# Patient Record
Sex: MALE | Race: BLACK OR AFRICAN AMERICAN | Employment: FULL TIME | URBAN - METROPOLITAN AREA
[De-identification: names, ages, dates, MRNs, and addresses within clinical notes are randomized per-mention and may not be internally consistent; named-entity substitution may affect disease eponyms.]

---

## 2018-05-29 ENCOUNTER — HOSPITAL ENCOUNTER (OUTPATIENT)
Age: 38
Discharge: HOME | End: 2018-05-29
Attending: FAMILY MEDICINE
Payer: COMMERCIAL

## 2018-05-29 VITALS
OXYGEN SATURATION: 100 % | HEART RATE: 85 BPM | TEMPERATURE: 98 F | RESPIRATION RATE: 18 BRPM | DIASTOLIC BLOOD PRESSURE: 73 MMHG | SYSTOLIC BLOOD PRESSURE: 100 MMHG

## 2018-05-29 DIAGNOSIS — J01.00 ACUTE NON-RECURRENT MAXILLARY SINUSITIS: Primary | ICD-10-CM

## 2018-05-29 DIAGNOSIS — J06.9 ACUTE URI: ICD-10-CM

## 2018-05-29 PROCEDURE — 99213 OFFICE O/P EST LOW 20 MIN: CPT

## 2018-05-29 PROCEDURE — 87430 STREP A AG IA: CPT

## 2018-05-29 PROCEDURE — 99214 OFFICE O/P EST MOD 30 MIN: CPT

## 2018-05-29 RX ORDER — CODEINE PHOSPHATE AND GUAIFENESIN 10; 100 MG/5ML; MG/5ML
10 SOLUTION ORAL NIGHTLY PRN
Qty: 100 ML | Refills: 0 | Status: SHIPPED | OUTPATIENT
Start: 2018-05-29 | End: 2018-06-08

## 2018-05-29 RX ORDER — AMOXICILLIN 875 MG/1
875 TABLET, COATED ORAL 2 TIMES DAILY
Qty: 20 TABLET | Refills: 0 | Status: SHIPPED | OUTPATIENT
Start: 2018-05-29 | End: 2018-06-08

## 2018-05-29 NOTE — ED PROVIDER NOTES
Patient presents with:  Cough/URI      HPI:     Bao  is a 40year old male who presents with for chief complaint of nasal congestion, sinus congestion, yellow nasal secretions, chest congestion, cough  X 6 days.     Getting worse  The patient yanet normal. No rashes or lesions      Assessment/Plan:     Labs performed this visit:    Recent Results (from the past 10 hour(s))  -[facility] POCT RAPID STREP   Collection Time: 05/29/18  2:39 PM   Result Value Ref Range   POCT Rapid Strep Negative Negative       D

## 2018-05-29 NOTE — ED INITIAL ASSESSMENT (HPI)
Patient comes in with cough and headache since Thursday. Over-the-counter remedies have not helped much.

## 2018-06-20 ENCOUNTER — HOSPITAL ENCOUNTER (OUTPATIENT)
Age: 38
Discharge: ANOTHER HOSPITAL | End: 2018-06-20
Attending: FAMILY MEDICINE
Payer: COMMERCIAL

## 2018-06-20 ENCOUNTER — HOSPITAL ENCOUNTER (EMERGENCY)
Facility: HOSPITAL | Age: 38
Discharge: HOME | End: 2018-06-20
Attending: EMERGENCY MEDICINE
Payer: COMMERCIAL

## 2018-06-20 ENCOUNTER — APPOINTMENT (OUTPATIENT)
Dept: GENERAL RADIOLOGY | Age: 38
End: 2018-06-20
Attending: FAMILY MEDICINE
Payer: COMMERCIAL

## 2018-06-20 VITALS
OXYGEN SATURATION: 99 % | BODY MASS INDEX: 23.8 KG/M2 | HEART RATE: 68 BPM | DIASTOLIC BLOOD PRESSURE: 86 MMHG | WEIGHT: 170 LBS | SYSTOLIC BLOOD PRESSURE: 142 MMHG | RESPIRATION RATE: 20 BRPM | HEIGHT: 71 IN | TEMPERATURE: 98 F

## 2018-06-20 VITALS
TEMPERATURE: 98 F | OXYGEN SATURATION: 99 % | DIASTOLIC BLOOD PRESSURE: 93 MMHG | SYSTOLIC BLOOD PRESSURE: 106 MMHG | RESPIRATION RATE: 16 BRPM | HEART RATE: 77 BPM

## 2018-06-20 DIAGNOSIS — J40 BRONCHITIS: Primary | ICD-10-CM

## 2018-06-20 DIAGNOSIS — R07.89 CHEST PAIN, NON-CARDIAC: ICD-10-CM

## 2018-06-20 DIAGNOSIS — R05.9 COUGH: ICD-10-CM

## 2018-06-20 DIAGNOSIS — R07.9 RIGHT-SIDED CHEST PAIN: Primary | ICD-10-CM

## 2018-06-20 PROCEDURE — 94640 AIRWAY INHALATION TREATMENT: CPT

## 2018-06-20 PROCEDURE — 93010 ELECTROCARDIOGRAM REPORT: CPT

## 2018-06-20 PROCEDURE — 85025 COMPLETE CBC W/AUTO DIFF WBC: CPT

## 2018-06-20 PROCEDURE — 85025 COMPLETE CBC W/AUTO DIFF WBC: CPT | Performed by: EMERGENCY MEDICINE

## 2018-06-20 PROCEDURE — 93005 ELECTROCARDIOGRAM TRACING: CPT

## 2018-06-20 PROCEDURE — 80048 BASIC METABOLIC PNL TOTAL CA: CPT | Performed by: EMERGENCY MEDICINE

## 2018-06-20 PROCEDURE — 93010 ELECTROCARDIOGRAM REPORT: CPT | Performed by: EMERGENCY MEDICINE

## 2018-06-20 PROCEDURE — 80048 BASIC METABOLIC PNL TOTAL CA: CPT

## 2018-06-20 PROCEDURE — 71046 X-RAY EXAM CHEST 2 VIEWS: CPT | Performed by: FAMILY MEDICINE

## 2018-06-20 PROCEDURE — 85379 FIBRIN DEGRADATION QUANT: CPT | Performed by: EMERGENCY MEDICINE

## 2018-06-20 PROCEDURE — 99214 OFFICE O/P EST MOD 30 MIN: CPT

## 2018-06-20 PROCEDURE — 99285 EMERGENCY DEPT VISIT HI MDM: CPT

## 2018-06-20 PROCEDURE — 36415 COLL VENOUS BLD VENIPUNCTURE: CPT

## 2018-06-20 PROCEDURE — 84484 ASSAY OF TROPONIN QUANT: CPT | Performed by: EMERGENCY MEDICINE

## 2018-06-20 PROCEDURE — 84484 ASSAY OF TROPONIN QUANT: CPT

## 2018-06-20 RX ORDER — PREDNISONE 20 MG/1
60 TABLET ORAL ONCE
Status: COMPLETED | OUTPATIENT
Start: 2018-06-20 | End: 2018-06-20

## 2018-06-20 RX ORDER — ALBUTEROL SULFATE 90 UG/1
2 AEROSOL, METERED RESPIRATORY (INHALATION) EVERY 4 HOURS PRN
Qty: 1 INHALER | Refills: 0 | Status: SHIPPED | OUTPATIENT
Start: 2018-06-20 | End: 2018-07-20

## 2018-06-20 RX ORDER — IPRATROPIUM BROMIDE AND ALBUTEROL SULFATE 2.5; .5 MG/3ML; MG/3ML
3 SOLUTION RESPIRATORY (INHALATION) ONCE
Status: COMPLETED | OUTPATIENT
Start: 2018-06-20 | End: 2018-06-20

## 2018-06-20 RX ORDER — PREDNISONE 20 MG/1
40 TABLET ORAL DAILY
Qty: 8 TABLET | Refills: 0 | Status: SHIPPED | OUTPATIENT
Start: 2018-06-20 | End: 2018-06-24

## 2018-06-20 NOTE — ED INITIAL ASSESSMENT (HPI)
Patient comes in with a persistent cough for over one month. He has been on amoxicillin for a sinus infection which has improved but cough persists.

## 2018-06-20 NOTE — ED INITIAL ASSESSMENT (HPI)
Patient presents from immediate care with abnormal EKG. Patient states he's having chest pain when he coughs.

## 2018-06-20 NOTE — ED NOTES
Patient referred to ER for further evaluation.  He will present to [address] [place] [address].

## 2018-06-20 NOTE — ED PROVIDER NOTES
Patient Seen in: [address]    History   Patient presents with:  Cough/URI    Stated Complaint: Severe cough and breathing     HPI    40year old patient with no significant PMHx presents to [address] [place] [address] with over a month of a produ turbinates mildly enlarged and erythematous. No sinus tenderness.  + clear rhinorrhea  NECK: supple, no rigidity, no adenopathy  THROAT: MMM, post pharynx injected, no exudate, no swelling, uvula midline, no trismus  LUNGS: no accessory use, b/l cta with g was sent to the ER and admitted overnight back in 2016. Did have a  Normal stress test at that time. EKG today does show some rate variation. Patient was kept on the monitor where his heart rate was consistently jumping from the low 50s to low 70s.   Hi

## 2018-06-20 NOTE — ED NOTES
Pt to the ed with chest pain when coughing for the past month pt was seen at the ic today and sent for further andriy d/t abnormal ekg. Pt states that he feels sob but denies any dizziness at this time. Awaiting dr ashraf. Will cont to monitor.

## 2018-06-21 NOTE — ED PROVIDER NOTES
Patient Seen in: [facility] Emergency Department    History   Patient presents with:  Abnormal Result (metabolic, cardiac)    Stated Complaint: abn ekg; from IC    HPI    52-year-old male presents for evaluation of abnormal EKG.   Patient presented distal pulses. Pulmonary/Chest: Effort normal and breath sounds normal. No respiratory distress. Bronchospastic cough noted   Abdominal: Soft. Bowel sounds are normal. There is no tenderness. Musculoskeletal: Normal range of motion.    Neurological: was also seen in EKG from 4/9/16, no ST depression, noted to have T-wave inversion in lead III also present on EKG from 4/9/16           ED Course as of Jun 21 0019  ------------------------------------------------------------      MDM     Differential vanessa

## 2022-12-29 ENCOUNTER — APPOINTMENT (OUTPATIENT)
Dept: GENERAL RADIOLOGY | Age: 42
End: 2022-12-29
Attending: NURSE PRACTITIONER
Payer: COMMERCIAL

## 2022-12-29 ENCOUNTER — HOSPITAL ENCOUNTER (OUTPATIENT)
Age: 42
Discharge: HOME | End: 2022-12-29
Payer: COMMERCIAL

## 2022-12-29 VITALS
DIASTOLIC BLOOD PRESSURE: 86 MMHG | SYSTOLIC BLOOD PRESSURE: 129 MMHG | TEMPERATURE: 98 F | OXYGEN SATURATION: 99 % | RESPIRATION RATE: 18 BRPM | HEART RATE: 76 BPM

## 2022-12-29 DIAGNOSIS — M79.673 FOOT PAIN: Primary | ICD-10-CM

## 2022-12-29 PROCEDURE — 99204 OFFICE O/P NEW MOD 45 MIN: CPT | Performed by: NURSE PRACTITIONER

## 2022-12-29 PROCEDURE — 73630 X-RAY EXAM OF FOOT: CPT | Performed by: NURSE PRACTITIONER

## 2022-12-29 RX ORDER — NAPROXEN 500 MG/1
500 TABLET ORAL 2 TIMES DAILY PRN
Qty: 20 TABLET | Refills: 0 | Status: SHIPPED | OUTPATIENT
Start: 2022-12-29 | End: 2023-01-08

## 2022-12-29 RX ORDER — LIDOCAINE 4 G/G
1 PATCH TOPICAL EVERY 24 HOURS
Qty: 7 PATCH | Refills: 0 | Status: SHIPPED | OUTPATIENT
Start: 2022-12-29 | End: 2023-01-05

## 2022-12-29 NOTE — ED INITIAL ASSESSMENT (HPI)
Pt came in due to left foot pain, redness, and swelling for the past 3 days. Pt denies any injury. Pt has easy non labored respirations.

## 2023-05-03 ENCOUNTER — APPOINTMENT (OUTPATIENT)
Dept: ULTRASOUND IMAGING | Facility: HOSPITAL | Age: 43
End: 2023-05-03
Attending: EMERGENCY MEDICINE
Payer: COMMERCIAL

## 2023-05-03 ENCOUNTER — HOSPITAL ENCOUNTER (EMERGENCY)
Facility: HOSPITAL | Age: 43
Discharge: HOME | End: 2023-05-03
Attending: EMERGENCY MEDICINE
Payer: COMMERCIAL

## 2023-05-03 VITALS
RESPIRATION RATE: 20 BRPM | HEART RATE: 80 BPM | TEMPERATURE: 98 F | WEIGHT: 170 LBS | SYSTOLIC BLOOD PRESSURE: 133 MMHG | BODY MASS INDEX: 24 KG/M2 | DIASTOLIC BLOOD PRESSURE: 76 MMHG | OXYGEN SATURATION: 98 %

## 2023-05-03 DIAGNOSIS — M76.61 TENDONITIS, ACHILLES, RIGHT: Primary | ICD-10-CM

## 2023-05-03 PROCEDURE — 93971 EXTREMITY STUDY: CPT | Performed by: EMERGENCY MEDICINE

## 2023-05-03 PROCEDURE — 99284 EMERGENCY DEPT VISIT MOD MDM: CPT

## 2023-05-03 RX ORDER — NAPROXEN 500 MG/1
500 TABLET ORAL 2 TIMES DAILY PRN
Qty: 14 TABLET | Refills: 0 | Status: SHIPPED | OUTPATIENT
Start: 2023-05-03 | End: 2023-05-10

## 2023-05-03 RX ORDER — HYDROCODONE BITARTRATE AND ACETAMINOPHEN 10; 325 MG/1; MG/1
TABLET ORAL EVERY 6 HOURS PRN
Qty: 10 TABLET | Refills: 0 | Status: SHIPPED | OUTPATIENT
Start: 2023-05-03 | End: 2023-05-08

## 2023-05-04 ENCOUNTER — PATIENT OUTREACH (OUTPATIENT)
Dept: CASE MANAGEMENT | Age: 43
End: 2023-05-04

## 2023-05-04 NOTE — PROGRESS NOTES
VM received; pt requesting assistance w/scheduling apt (dc 05/03)    Dr Emeterio Beyer, Surgery, Hand & Microvascular  [id number] [facility]

## 2023-05-04 NOTE — ED INITIAL ASSESSMENT (HPI)
The patient reports posterior right lower leg pain with history of a torn achilles tendon. No known event causing injury. The patient is also requesting an epi pen refill.

## (undated) NOTE — LETTER
Date & Time: 12/29/2022, 8:55 AM  Patient: Nick Butler  Encounter Provider(s):    RODRIGUEZ Garcias       To Whom It May Concern:    Nick Butler was seen and treated in our department on 12/29/2022. He should be excused from work to return 12/30/22.     If you have any questions or concerns, please do not hesitate to call.        _____________________________  Physician/APC Signature

## (undated) NOTE — LETTER
Date & Time: 5/3/2023, 10:38 PM  Patient: Ekaterina Emmanuel  Encounter Provider(s):    Mohan Domingo MD       To Whom It May Concern:    Ekaterina Emmanuel was seen and treated in our department on 5/3/2023. He should not return to work until 5/5/23 .     If you have any questions or concerns, please do not hesitate to call.        _____________________________  Physician/APC Signature

## (undated) NOTE — ED AVS SNAPSHOT
Toshia Kruse   MRN: [id number]    Department:  [facility] Emergency Department   Date of Visit:  6/20/2018           Disclosure     Insurance plans vary and the physician(s) referred by the ER may not be covered by your plan.  Please contact y CARE PHYSICIAN AT ONCE OR RETURN IMMEDIATELY TO THE EMERGENCY DEPARTMENT. If you have been prescribed any medication(s), please fill your prescription right away and begin taking the medication(s) as directed.   If you believe that any of the medications